# Patient Record
Sex: FEMALE | Race: WHITE | NOT HISPANIC OR LATINO | Employment: OTHER | URBAN - METROPOLITAN AREA
[De-identification: names, ages, dates, MRNs, and addresses within clinical notes are randomized per-mention and may not be internally consistent; named-entity substitution may affect disease eponyms.]

---

## 2018-11-29 ENCOUNTER — TELEPHONE (OUTPATIENT)
Dept: OTHER | Facility: OTHER | Age: 83
End: 2018-11-29

## 2018-11-29 ENCOUNTER — HOSPITAL ENCOUNTER (INPATIENT)
Facility: HOSPITAL | Age: 83
LOS: 1 days | Discharge: NURSING, REHAB OR LONG-TERM CARE | DRG: 378 | End: 2018-11-30
Attending: EMERGENCY MEDICINE | Admitting: HOSPITALIST
Payer: MEDICARE

## 2018-11-29 DIAGNOSIS — K92.2 GASTROINTESTINAL HEMORRHAGE, UNSPECIFIED GASTROINTESTINAL HEMORRHAGE TYPE: ICD-10-CM

## 2018-11-29 DIAGNOSIS — S31.000A SACRAL WOUND: ICD-10-CM

## 2018-11-29 DIAGNOSIS — K92.2 ACUTE UPPER GI BLEED: Primary | ICD-10-CM

## 2018-11-29 LAB
APTT PPP: 33 SECONDS (ref 26–38)
BASOPHILS # BLD AUTO: 0.04 THOUSANDS/ΜL (ref 0–0.1)
BASOPHILS NFR BLD AUTO: 1 % (ref 0–1)
EOSINOPHIL # BLD AUTO: 0.25 THOUSAND/ΜL (ref 0–0.61)
EOSINOPHIL NFR BLD AUTO: 4 % (ref 0–6)
ERYTHROCYTE [DISTWIDTH] IN BLOOD BY AUTOMATED COUNT: 16.4 % (ref 11.6–15.1)
HCT VFR BLD AUTO: 28.6 % (ref 34.8–46.1)
HGB BLD-MCNC: 9 G/DL (ref 11.5–15.4)
IMM GRANULOCYTES # BLD AUTO: 0.02 THOUSAND/UL (ref 0–0.2)
IMM GRANULOCYTES NFR BLD AUTO: 0 % (ref 0–2)
INR PPP: 1.05 (ref 0.86–1.17)
LYMPHOCYTES # BLD AUTO: 1.59 THOUSANDS/ΜL (ref 0.6–4.47)
LYMPHOCYTES NFR BLD AUTO: 22 % (ref 14–44)
MCH RBC QN AUTO: 31 PG (ref 26.8–34.3)
MCHC RBC AUTO-ENTMCNC: 31.5 G/DL (ref 31.4–37.4)
MCV RBC AUTO: 99 FL (ref 82–98)
MONOCYTES # BLD AUTO: 0.77 THOUSAND/ΜL (ref 0.17–1.22)
MONOCYTES NFR BLD AUTO: 11 % (ref 4–12)
NEUTROPHILS # BLD AUTO: 4.46 THOUSANDS/ΜL (ref 1.85–7.62)
NEUTS SEG NFR BLD AUTO: 62 % (ref 43–75)
NRBC BLD AUTO-RTO: 0 /100 WBCS
PLATELET # BLD AUTO: 279 THOUSANDS/UL (ref 149–390)
PMV BLD AUTO: 9.1 FL (ref 8.9–12.7)
PROTHROMBIN TIME: 13.1 SECONDS (ref 11.8–14.2)
RBC # BLD AUTO: 2.9 MILLION/UL (ref 3.81–5.12)
WBC # BLD AUTO: 7.13 THOUSAND/UL (ref 4.31–10.16)

## 2018-11-29 PROCEDURE — 85730 THROMBOPLASTIN TIME PARTIAL: CPT | Performed by: EMERGENCY MEDICINE

## 2018-11-29 PROCEDURE — 99285 EMERGENCY DEPT VISIT HI MDM: CPT

## 2018-11-29 PROCEDURE — 85025 COMPLETE CBC W/AUTO DIFF WBC: CPT

## 2018-11-29 PROCEDURE — 86900 BLOOD TYPING SEROLOGIC ABO: CPT | Performed by: EMERGENCY MEDICINE

## 2018-11-29 PROCEDURE — 80053 COMPREHEN METABOLIC PANEL: CPT | Performed by: EMERGENCY MEDICINE

## 2018-11-29 PROCEDURE — 93005 ELECTROCARDIOGRAM TRACING: CPT

## 2018-11-29 PROCEDURE — 82272 OCCULT BLD FECES 1-3 TESTS: CPT

## 2018-11-29 PROCEDURE — 86850 RBC ANTIBODY SCREEN: CPT | Performed by: EMERGENCY MEDICINE

## 2018-11-29 PROCEDURE — 36415 COLL VENOUS BLD VENIPUNCTURE: CPT | Performed by: EMERGENCY MEDICINE

## 2018-11-29 PROCEDURE — 84484 ASSAY OF TROPONIN QUANT: CPT | Performed by: EMERGENCY MEDICINE

## 2018-11-29 PROCEDURE — 85610 PROTHROMBIN TIME: CPT | Performed by: EMERGENCY MEDICINE

## 2018-11-29 PROCEDURE — 86901 BLOOD TYPING SEROLOGIC RH(D): CPT | Performed by: EMERGENCY MEDICINE

## 2018-11-30 VITALS
RESPIRATION RATE: 22 BRPM | OXYGEN SATURATION: 97 % | DIASTOLIC BLOOD PRESSURE: 70 MMHG | HEART RATE: 77 BPM | BODY MASS INDEX: 21.66 KG/M2 | WEIGHT: 114.64 LBS | SYSTOLIC BLOOD PRESSURE: 158 MMHG | TEMPERATURE: 98.6 F

## 2018-11-30 PROBLEM — K92.2 GI BLEED: Status: ACTIVE | Noted: 2018-11-30

## 2018-11-30 LAB
ALBUMIN SERPL BCP-MCNC: 3.2 G/DL (ref 3.5–5)
ALP SERPL-CCNC: 64 U/L (ref 46–116)
ALT SERPL W P-5'-P-CCNC: 16 U/L (ref 12–78)
ANION GAP SERPL CALCULATED.3IONS-SCNC: 7 MMOL/L (ref 4–13)
ANION GAP SERPL CALCULATED.3IONS-SCNC: 7 MMOL/L (ref 4–13)
AST SERPL W P-5'-P-CCNC: 17 U/L (ref 5–45)
ATRIAL RATE: 78 BPM
BILIRUB SERPL-MCNC: 0.3 MG/DL (ref 0.2–1)
BUN SERPL-MCNC: 58 MG/DL (ref 5–25)
BUN SERPL-MCNC: 63 MG/DL (ref 5–25)
CALCIUM SERPL-MCNC: 8.6 MG/DL (ref 8.3–10.1)
CALCIUM SERPL-MCNC: 8.6 MG/DL (ref 8.3–10.1)
CHLORIDE SERPL-SCNC: 100 MMOL/L (ref 100–108)
CHLORIDE SERPL-SCNC: 99 MMOL/L (ref 100–108)
CO2 SERPL-SCNC: 29 MMOL/L (ref 21–32)
CO2 SERPL-SCNC: 29 MMOL/L (ref 21–32)
CREAT SERPL-MCNC: 2.5 MG/DL (ref 0.6–1.3)
CREAT SERPL-MCNC: 2.7 MG/DL (ref 0.6–1.3)
GFR SERPL CREATININE-BSD FRML MDRD: 15 ML/MIN/1.73SQ M
GFR SERPL CREATININE-BSD FRML MDRD: 16 ML/MIN/1.73SQ M
GLUCOSE SERPL-MCNC: 95 MG/DL (ref 65–140)
GLUCOSE SERPL-MCNC: 96 MG/DL (ref 65–140)
HGB BLD-MCNC: 9.4 G/DL (ref 11.5–15.4)
P AXIS: 79 DEGREES
POTASSIUM SERPL-SCNC: 4 MMOL/L (ref 3.5–5.3)
POTASSIUM SERPL-SCNC: 4.4 MMOL/L (ref 3.5–5.3)
PR INTERVAL: 288 MS
PROT SERPL-MCNC: 7.8 G/DL (ref 6.4–8.2)
QRS AXIS: -48 DEGREES
QRSD INTERVAL: 80 MS
QT INTERVAL: 360 MS
QTC INTERVAL: 410 MS
SODIUM SERPL-SCNC: 135 MMOL/L (ref 136–145)
SODIUM SERPL-SCNC: 136 MMOL/L (ref 136–145)
T WAVE AXIS: -9 DEGREES
TROPONIN I SERPL-MCNC: 0.02 NG/ML
VENTRICULAR RATE: 78 BPM

## 2018-11-30 PROCEDURE — 99232 SBSQ HOSP IP/OBS MODERATE 35: CPT | Performed by: NURSE PRACTITIONER

## 2018-11-30 PROCEDURE — 93010 ELECTROCARDIOGRAM REPORT: CPT | Performed by: INTERNAL MEDICINE

## 2018-11-30 PROCEDURE — 99223 1ST HOSP IP/OBS HIGH 75: CPT | Performed by: HOSPITALIST

## 2018-11-30 PROCEDURE — C9113 INJ PANTOPRAZOLE SODIUM, VIA: HCPCS | Performed by: PHYSICIAN ASSISTANT

## 2018-11-30 PROCEDURE — 80048 BASIC METABOLIC PNL TOTAL CA: CPT | Performed by: PHYSICIAN ASSISTANT

## 2018-11-30 PROCEDURE — 99239 HOSP IP/OBS DSCHRG MGMT >30: CPT | Performed by: HOSPITALIST

## 2018-11-30 PROCEDURE — 85018 HEMOGLOBIN: CPT | Performed by: PHYSICIAN ASSISTANT

## 2018-11-30 RX ORDER — ONDANSETRON 2 MG/ML
4 INJECTION INTRAMUSCULAR; INTRAVENOUS EVERY 6 HOURS PRN
Status: DISCONTINUED | OUTPATIENT
Start: 2018-11-30 | End: 2018-11-30 | Stop reason: HOSPADM

## 2018-11-30 RX ORDER — ACETAMINOPHEN 325 MG/1
1-2 TABLET ORAL EVERY 4 HOURS PRN
COMMUNITY

## 2018-11-30 RX ORDER — AMLODIPINE BESYLATE 5 MG/1
5 TABLET ORAL DAILY
Status: DISCONTINUED | OUTPATIENT
Start: 2018-11-30 | End: 2018-11-30 | Stop reason: HOSPADM

## 2018-11-30 RX ORDER — PANTOPRAZOLE SODIUM 40 MG/1
40 TABLET, DELAYED RELEASE ORAL
Refills: 0
Start: 2018-12-01

## 2018-11-30 RX ORDER — ACETAMINOPHEN 325 MG/1
650 TABLET ORAL EVERY 6 HOURS PRN
Status: DISCONTINUED | OUTPATIENT
Start: 2018-11-30 | End: 2018-11-30 | Stop reason: HOSPADM

## 2018-11-30 RX ORDER — SODIUM PHOSPHATE, DIBASIC AND SODIUM PHOSPHATE, MONOBASIC 7; 19 G/133ML; G/133ML
ENEMA RECTAL
COMMUNITY

## 2018-11-30 RX ORDER — DIGOXIN 125 MCG
TABLET ORAL
COMMUNITY

## 2018-11-30 RX ORDER — ISOSORBIDE MONONITRATE 30 MG/1
30 TABLET, EXTENDED RELEASE ORAL DAILY
Status: DISCONTINUED | OUTPATIENT
Start: 2018-11-30 | End: 2018-11-30 | Stop reason: HOSPADM

## 2018-11-30 RX ORDER — BISACODYL 10 MG
1 SUPPOSITORY, RECTAL RECTAL DAILY
COMMUNITY

## 2018-11-30 RX ORDER — LEVOTHYROXINE SODIUM 0.07 MG/1
0.5 TABLET ORAL DAILY
COMMUNITY

## 2018-11-30 RX ORDER — SODIUM CHLORIDE 9 MG/ML
75 INJECTION, SOLUTION INTRAVENOUS CONTINUOUS
Status: DISCONTINUED | OUTPATIENT
Start: 2018-11-30 | End: 2018-11-30 | Stop reason: HOSPADM

## 2018-11-30 RX ORDER — METOPROLOL TARTRATE 50 MG/1
1 TABLET, FILM COATED ORAL 2 TIMES DAILY
COMMUNITY

## 2018-11-30 RX ORDER — MULTIVIT-MIN/IRON/FOLIC ACID/K 18-600-40
CAPSULE ORAL
COMMUNITY

## 2018-11-30 RX ORDER — AMLODIPINE BESYLATE 5 MG/1
1 TABLET ORAL DAILY
COMMUNITY

## 2018-11-30 RX ORDER — PANTOPRAZOLE SODIUM 40 MG/1
40 TABLET, DELAYED RELEASE ORAL
Status: DISCONTINUED | OUTPATIENT
Start: 2018-12-01 | End: 2018-11-30 | Stop reason: HOSPADM

## 2018-11-30 RX ORDER — ISOSORBIDE MONONITRATE 30 MG/1
1 TABLET, EXTENDED RELEASE ORAL DAILY
COMMUNITY

## 2018-11-30 RX ORDER — DIGOXIN 125 MCG
125 TABLET ORAL DAILY
Status: DISCONTINUED | OUTPATIENT
Start: 2018-11-30 | End: 2018-11-30 | Stop reason: HOSPADM

## 2018-11-30 RX ORDER — LEVOTHYROXINE SODIUM 0.07 MG/1
37.5 TABLET ORAL
Status: DISCONTINUED | OUTPATIENT
Start: 2018-11-30 | End: 2018-11-30 | Stop reason: HOSPADM

## 2018-11-30 RX ORDER — FUROSEMIDE 40 MG/1
1 TABLET ORAL DAILY
COMMUNITY

## 2018-11-30 RX ORDER — METOPROLOL TARTRATE 50 MG/1
50 TABLET, FILM COATED ORAL 2 TIMES DAILY
Status: DISCONTINUED | OUTPATIENT
Start: 2018-11-30 | End: 2018-11-30 | Stop reason: HOSPADM

## 2018-11-30 RX ORDER — FUROSEMIDE 40 MG/1
40 TABLET ORAL DAILY
Status: DISCONTINUED | OUTPATIENT
Start: 2018-11-30 | End: 2018-11-30 | Stop reason: HOSPADM

## 2018-11-30 RX ADMIN — Medication 80 MG: at 03:58

## 2018-11-30 RX ADMIN — SODIUM CHLORIDE 75 ML/HR: 0.9 INJECTION, SOLUTION INTRAVENOUS at 04:01

## 2018-11-30 RX ADMIN — FUROSEMIDE 40 MG: 40 TABLET ORAL at 08:01

## 2018-11-30 RX ADMIN — Medication 8 MG/HR: at 04:33

## 2018-11-30 RX ADMIN — ISOSORBIDE MONONITRATE 30 MG: 30 TABLET, EXTENDED RELEASE ORAL at 08:01

## 2018-11-30 RX ADMIN — METOPROLOL TARTRATE 50 MG: 50 TABLET, FILM COATED ORAL at 08:01

## 2018-11-30 RX ADMIN — LEVOTHYROXINE SODIUM 37.5 MCG: 75 TABLET ORAL at 08:00

## 2018-11-30 RX ADMIN — AMLODIPINE BESYLATE 5 MG: 5 TABLET ORAL at 08:01

## 2018-11-30 RX ADMIN — DIGOXIN 125 MCG: 125 TABLET ORAL at 08:01

## 2018-11-30 NOTE — ED PROVIDER NOTES
History  Chief Complaint   Patient presents with    Rectal Bleeding     pt presents to ER from [address] [place] [address] after noticing her having multiple episodes of rectal bleeding, patient had blood work done and has a known low H/H but patient was refusing blood       This 80-year-old female with history of aortic aneurysm CAD atrial fibrillation, chronic kidney disease, benign sigmoid tumor, year old bowel syndrome, chronic CHF and dementia has rectal bleeding tonight  She denies any new pain  She denies nausea vomiting  Patient denies chest pain palpitations and dyspnea on exertion but she is a poor historian  She is not sure how she would  Feel if she ambulated  Prior to Admission Medications   Prescriptions Last Dose Informant Patient Reported? Taking?    Cholecalciferol (VITAMIN D) 2000 units CAPS   Yes No   Sig: Take by mouth   acetaminophen (TYLENOL) 325 mg tablet   Yes No   Sig: Take 1-2 tablets by mouth every 4 (four) hours as needed   amLODIPine (NORVASC) 5 mg tablet   Yes No   Sig: Take 1 tablet by mouth daily   bisacodyl (DULCOLAX) 10 mg suppository   Yes No   Sig: Insert 1 suppository into the rectum daily   digoxin (LANOXIN) 0 125 mg tablet   Yes No   Sig: Take by mouth   furosemide (LASIX) 40 mg tablet   Yes No   Sig: Take 1 tablet by mouth daily   isosorbide mononitrate (IMDUR) 30 mg 24 hr tablet   Yes No   Sig: Take 1 tablet by mouth daily   levothyroxine (SYNTHROID) 75 mcg tablet   Yes No   Sig: Take 0 5 tablets by mouth daily   magnesium hydroxide (MILK OF MAGNESIA) 400 mg/5 mL oral suspension   Yes No   Sig: Take by mouth   metoprolol tartrate (LOPRESSOR) 50 mg tablet   Yes No   Sig: Take 1 tablet by mouth 2 (two) times a day   sodium phosphate-biphosphate (FLEET) 7-19 g 118 mL enema   Yes No   Sig: Insert into the rectum      Facility-Administered Medications: None       Past Medical History:   Diagnosis Date    Abdominal aortic aneurysm without rupture (HCC)     Abnormal weight loss  Acute respiratory failure with hypoxia and hypercapnia (HCC)     Age related osteoporosis     Age-related nuclear cataract, bilateral     Anxiety     Atherosclerotic heart disease of native coronary artery without angina pectoris     Atrial fibrillation (HCC)     Benign neoplasm of sigmoid colon     Blindness of right eye     Calculus of gallbladder with chronic cholecystitis     Calculus of gallbladder without cholecystitis with obstruction     Chronic kidney disease, stage III (moderate) ([facility])     Chronic pain     Chronic pain due to trauma     Dementia     Difficulty walking     Disease of thyroid gland     Generalized atherosclerosis     GERD (gastroesophageal reflux disease)     Head injury     Heart failure (HCC)     Hemorrhoids     Hx of falling     Hypertension     Hypertensive retinopathy     IBS (irritable bowel syndrome)     Iron deficiency     Malignant neoplasm of head, face and neck (HCC)     [facility] infarction) [facility]     Mixed incontinence     Nonrheumatic mitral (valve) insufficiency     Occlusion and stenosis of left carotid artery     Polyosteoarthritis     Presence of intraocular lens     PVD (peripheral vascular disease) ([facility])     Rectal polyp     Recurrent pneumonia     Renal disorder     CKD stage 3    Thoracic aortic aneurysm without rupture ([facility] )     TIA (transient ischemic attack)     Tinea unguium     Unspecified abdominal pain     Unspecified fracture of unspecified thoracic vertebra, initial encounter for closed fracture ([facility] )        History reviewed  No pertinent surgical history  History reviewed  No pertinent family history  I have reviewed and agree with the history as documented      Social History   Substance Use Topics    Smoking status: Never Smoker    Smokeless tobacco: Never Used    Alcohol use No        Review of Systems   Unable to perform ROS: Dementia       Physical Exam  Physical Exam   Constitutional: She is oriented to person, place, and time  She appears well-developed and well-nourished  No distress  HENT:   Head: Normocephalic and atraumatic  Mouth/Throat: Oropharynx is clear and moist    Eyes: Pupils are equal, round, and reactive to light  Conjunctivae and EOM are normal    Neck: Normal range of motion  Neck supple  No JVD present  Cardiovascular: Normal rate and intact distal pulses  No murmur heard  Irregular irregular rhythm   Pulmonary/Chest: Effort normal and breath sounds normal  No stridor  Abdominal: Soft  Bowel sounds are normal  She exhibits mass ( palpable upper abdominal pulsating mass)  She exhibits no distension  There is tenderness (  Tender in left upper quadrant)  There is no rebound and no guarding  No hernia  Genitourinary: Rectal exam shows guaiac positive stool  Genitourinary Comments: External hemorrhoids noted  Unable to pass finger to the rectal wall  Large amount of black stool which is heme-positive   Musculoskeletal: Normal range of motion  She exhibits no edema  Neurological: She is alert and oriented to person, place, and time  She has normal reflexes  No cranial nerve deficit  Coordination normal    Skin: Skin is warm and dry  Capillary refill takes less than 2 seconds  No rash noted  She is not diaphoretic  There is pallor  Psychiatric: She has a normal mood and affect  Nursing note and vitals reviewed        Vital Signs  ED Triage Vitals [11/29/18 2323]   Temperature Pulse Respirations Blood Pressure SpO2   98 9 °F (37 2 °C) 78 18 (!) 189/81 97 %      Temp Source Heart Rate Source Patient Position - Orthostatic VS BP Location FiO2 (%)   Temporal Monitor Lying Left arm --      Pain Score       No Pain           Vitals:    11/30/18 0030 11/30/18 0045 11/30/18 0100 11/30/18 0115   BP: 153/67 151/68 153/68 148/67   Pulse: 78 79 80 81   Patient Position - Orthostatic VS:           Visual Acuity  Visual Acuity      Most Recent Value   L Pupil Size (mm)  3   R Pupil Size (mm)  3          ED Medications  Medications - No data to display    Diagnostic Studies  Results Reviewed     Procedure Component Value Units Date/Time    Hemoglobin and hematocrit, blood [[id number]]     Lab Status:  No result Specimen:  Blood     Troponin I [[id number]]  (Normal) Collected:  11/29/18 2332    Lab Status:  Final result Specimen:  Blood from Arm, Right Updated:  11/30/18 0003     Troponin I 0 02 ng/mL     Comprehensive metabolic panel [[id number]]  (Abnormal) Collected:  11/29/18 2332    Lab Status:  Final result Specimen:  Blood from Arm, Right Updated:  11/30/18 0002     Sodium 135 (L) mmol/L      Potassium 4 4 mmol/L      Chloride 99 (L) mmol/L      CO2 29 mmol/L      ANION GAP 7 mmol/L      BUN 63 (H) mg/dL      Creatinine 2 70 (H) mg/dL      Glucose 95 mg/dL      Calcium 8 6 mg/dL      AST 17 U/L      ALT 16 U/L      Alkaline Phosphatase 64 U/L      Total Protein 7 8 g/dL      Albumin 3 2 (L) g/dL      Total Bilirubin 0 30 mg/dL      eGFR 15 ml/min/1 73sq m     Narrative:         National Kidney Disease Education Program recommendations are as follows:  GFR calculation is accurate only with a steady state creatinine  Chronic Kidney disease less than 60 ml/min/1 73 sq  meters  Kidney failure less than 15 ml/min/1 73 sq  meters      Protime-INR [[id number]]  (Normal) Collected:  11/29/18 2335    Lab Status:  Final result Specimen:  Blood from Arm, Right Updated:  11/29/18 2357     Protime 13 1 seconds      INR 1 05    APTT [[id number]]  (Normal) Collected:  11/29/18 2335    Lab Status:  Final result Specimen:  Blood from Arm, Right Updated:  11/29/18 2357     PTT 33 seconds     CBC and differential [[id number]]  (Abnormal) Collected:  11/29/18 2332    Lab Status:  Final result Specimen:  Blood from Arm, Right Updated:  11/29/18 2345     WBC 7 13 Thousand/uL      RBC 2 90 (L) Million/uL      Hemoglobin 9 0 (L) g/dL      Hematocrit 28 6 (L) %      MCV 99 (H) fL      MCH 31 0 pg      MCHC 31 5 g/dL      RDW 16  4 (H) %      MPV 9 1 fL      Platelets 572 Thousands/uL      nRBC 0 /100 WBCs      Neutrophils Relative 62 %      Immat GRANS % 0 %      Lymphocytes Relative 22 %      Monocytes Relative 11 %      Eosinophils Relative 4 %      Basophils Relative 1 %      Neutrophils Absolute 4 46 Thousands/µL      Immature Grans Absolute 0 02 Thousand/uL      Lymphocytes Absolute 1 59 Thousands/µL      Monocytes Absolute 0 77 Thousand/µL      Eosinophils Absolute 0 25 Thousand/µL      Basophils Absolute 0 04 Thousands/µL                  No orders to display              Procedures  ECG 12 Lead Documentation  Date/Time: 11/30/2018 12:03 AM  Performed by: Edgar Cárdenas  Authorized by: Kennth Grand     ECG reviewed by me, the ED Provider: yes    Patient location:  ED  Previous ECG:     Previous ECG:  Compared to current    Comparison ECG info:   compared to September 16, 2015,    Similarity:  No change  Rhythm:     Rhythm: sinus rhythm and A-V block    Ectopy:     Ectopy: none    QRS:     QRS axis:  Left    QRS intervals:  Normal  Conduction:     Conduction: normal    ST segments:     ST segments:  Normal  T waves:     T waves: inverted      Inverted:  III, aVF, V3 and V4  Q waves:     Q waves:  III and aVF           Phone Contacts  ED Phone Contact    ED Course                               MDM  Number of Diagnoses or Management Options  Acute upper GI bleed: new and requires workup  Diagnosis management comments:  Case discussed with patient's power of  who is her sister Triny Vail  Both patient and the power of  believes she should be admitted for workup  Patient has had a small amount of red rectal bleeding here         Amount and/or Complexity of Data Reviewed  Clinical lab tests: ordered and reviewed  Review and summarize past medical records: yes  Discuss the patient with other providers: yes  Independent visualization of images, tracings, or specimens: yes      CritCare Time    Disposition  Final diagnoses:   Acute upper GI bleed     Time reflects when diagnosis was documented in both MDM as applicable and the Disposition within this note     Time User Action Codes Description Comment    11/30/2018  1:34 AM Shonaabigail Chacon Miriam [K92 2] Acute upper GI bleed       ED Disposition     ED Disposition Condition Comment    Admit  Case was discussed with  Night practitioner and the patient's admission status was agreed to be Admission Status: inpatient status to the service of Dr Christian Lynch   Follow-up Information    None         Patient's Medications   Discharge Prescriptions    No medications on file     No discharge procedures on file      ED Provider  Electronically Signed by           Ebony Luong DO  11/30/18 9810

## 2018-11-30 NOTE — DISCHARGE SUMMARY
Discharge- Tye Valdes 2/6/1925, 80 y o  female MRN: [id number]    Unit/Bed#: -02 Encounter: [id number]    Primary Care Provider: Puja Meredith MD   Date and time admitted to hospital: 11/29/2018 11:21 PM        Chronic kidney disease, stage IV (severe) (HCC)   Assessment & Plan    Cr 2 7, likely patients new baseline, continue to monitor     * GI bleed   Assessment & Plan    -patient received a Protonix drip on the hospital   -her hemoglobin was trended and is stable at 9 4     -She presented with bright red blood per rectum but has had smears of melena   -patient did have acute blood loss anemia due to GI bleeding, POA  -I did discuss the case with GI  In 2015 the patient had a rectal polyp/mass that was unable to be excised on colonoscopy  Surgical consultation was recommended but the patient refused  I did have a lengthy discussion with the patient's niece and sister  They do not want any further workup, studies, or procedures done  They will likely make the patient do not hospitalize after discharge and may pursue hospice  Discharging Physician / Practitioner: Tasha Grant MD  PCP: Puja Meredith MD  Admission Date:   Admission Orders     Ordered        11/30/18 0136  Inpatient Admission (expected length of stay for this patient is greater than two midnights)  Once             Discharge Date: 11/30/18    Resolved Problems  Date Reviewed: 11/30/2018    None          Consultations During Hospital Stay:  · GI    Procedures Performed:     · None, family does not want any procedures    Significant Findings / Test Results:     · Anemia    Incidental Findings:   · None    Test Results Pending at Discharge (will require follow up):    · None     Outpatient Tests Requested:  · None, patient's family does not want any further testing    Complications:  None    Reason for Admission:  Bright red blood per rectum and melena    Hospital Course:     Tye Valdes is a 80 y o  female patient who originally presented to the hospital on 11/29/2018 due to Bright red blood per rectum and melena as outlined in the H&P done earlier today  Please see above list of diagnoses and related plan for additional information  Condition at Discharge: fair     Discharge Day Visit / Exam:     Subjective:  Patient without acute complaints at this time  As per nursing patient with smears of melena  Vitals: Blood Pressure: 163/77 (11/30/18 0721)  Pulse: 95 (11/30/18 0721)  Temperature: 98 6 °F (37 °C) (11/30/18 0721)  Temp Source: Oral (11/30/18 0300)  Respirations: 16 (11/30/18 0721)  Weight - Scale: 52 kg (114 lb 10 2 oz) (11/30/18 0721)  SpO2: 97 % (11/30/18 0721)  Exam:   Physical Exam  Gen: NAD, awake and alert, well developed, well nourished  Eyes: EOMI, PERRLA, no scleral icterus  ENMT:  Oropharynx clear of erythema or exudates, no nasal discharge, no otic discharge, moist mucous membranes  Neck:  Supple  Lymph:  No anterior or posterior cervical or supraclavicular lymphadenopathy  Cardiovascular:  Regular rate and rhythm, normal S1-S2, no murmurs, rubs, or gallops  Lungs:  Clear to auscultation bilaterally anteriorly, no wheezes, or rales, or rhonchi  Abdomen:  Positive bowel sounds, soft, nontender, nondistended, no palpable organomegaly   Skin:  Intact, no obvious lesions or rashes, no edema  Neuro: Cranial nerves 2-12 are intact, non-focal    Discussion with Family:  Patient's niece and daughter at bedside    Discharge instructions/Information to patient and family:   See after visit summary for information provided to patient and family  Provisions for Follow-Up Care:  See after visit summary for information related to follow-up care and any pertinent home health orders        Disposition:     [facility] [address] [facility] to [facility]:   · Not Applicable to this Patient - Not Applicable to this Patient    Planned Readmission: None but if patient is not may do not hospitalize or hospice she surely will be back within 30 days     Discharge Statement:  I spent 35 minutes discharging the patient  This time was spent on the day of discharge  I had direct contact with the patient on the day of discharge  Greater than 50% of the total time was spent examining patient, answering all patient questions, arranging and discussing plan of care with patient as well as directly providing post-discharge instructions  Additional time then spent on discharge activities  Discharge Medications:  See after visit summary for reconciled discharge medications provided to patient and family        ** Please Note: This note has been constructed using a voice recognition system **

## 2018-11-30 NOTE — H&P
[address] [facility]    H&P- Tran Ibanez 2/6/1925, 80 y o  female MRN: [id number]    Unit/Bed#: -02 Encounter: [id number]    Primary Care Provider: Salvador Leahy MD   Date and time admitted to hospital: 11/29/2018 11:21 PM        * GI bleed   Assessment & Plan    H&H Q 6H  GI consult  NPO  D5 with NSS 75 mL/hour  PPI gtt  Patient states she does not want blood transfusion  Type and screen  Cardiopulmonary monitoring     Chronic kidney disease, stage III (moderate) (HCC)   Assessment & Plan    Cr 2 7, likely patients new baseline, continue to monitor       VTE Prophylaxis: None due to GI bleed  / sequential compression device   Code Status: Level 3 DNI/DNR  POLST: POLST form is on file already (pre-hospital)  Discussion with family: None    Anticipated Length of Stay:  Patient will be admitted on an Inpatient basis with an anticipated length of stay of  Greater than 2 midnights  Justification for Hospital Stay: Rectal bleed    Chief Complaint:   Rectal Bleeding    History of Present Illness:    Tran Ibanez is a 80 y o  female with a PMH for CAD, AFib, CKD, CHF, dementia presents with rectal bleeding that began tonight  Patient denies pain and states that she has not noticed any dark or bloody bowel movements  Patient came via [address] [facility] after an aide noticed the patient had multiple episodes of rectal bleeding  Patient has a known history of low hemoglobin and hematocrit has been worked up in the past   Patient states adamantly that she does not want a blood transfusion and may not wish to undergo EGD, colonoscopy  She is amicable to IVF  Patient has no other complaints and denies shortness of breath, anxiety, chest pain, palpitations, nausea, vomiting, diarrhea  Review of Systems:    Review of Systems   Constitutional: Negative for chills, diaphoresis, fatigue and fever  Eyes: Negative for photophobia, redness and visual disturbance     Respiratory: Negative for cough, shortness of breath and wheezing  Cardiovascular: Negative for chest pain, palpitations and leg swelling  Gastrointestinal: Positive for anal bleeding  Negative for constipation, diarrhea, nausea and vomiting  Endocrine: Negative for polydipsia, polyphagia and polyuria  Genitourinary: Negative for dysuria, flank pain, frequency, hematuria and urgency  Musculoskeletal: Negative for back pain, neck pain and neck stiffness  Skin: Negative for pallor, rash and wound  Neurological: Negative for dizziness, tremors, syncope, weakness, light-headedness and headaches  All other systems reviewed and are negative        Past Medical and Surgical History:     Past Medical History:   Diagnosis Date    Abdominal aortic aneurysm without rupture (HCC)     Abnormal weight loss     Acute respiratory failure with hypoxia and hypercapnia (HCC)     Age related osteoporosis     Age-related nuclear cataract, bilateral     Anxiety     Atherosclerotic heart disease of native coronary artery without angina pectoris     Atrial fibrillation (HCC)     Benign neoplasm of sigmoid colon     Blindness of right eye     Calculus of gallbladder with chronic cholecystitis     Calculus of gallbladder without cholecystitis with obstruction     [facility], [facility]) [facility]     Chronic pain     Chronic pain due to trauma     Dementia     Difficulty walking     Disease of thyroid gland     Generalized atherosclerosis     GERD (gastroesophageal reflux disease)     Head injury     Heart failure (HCC)     Hemorrhoids     Hx of falling     Hypertension     Hypertensive retinopathy     IBS (irritable bowel syndrome)     Iron deficiency     Malignant neoplasm of head, face and neck (HCC)     [facility]) [facility]     Mixed incontinence     Nonrheumatic mitral (valve) insufficiency     Occlusion and stenosis of left carotid artery     Polyosteoarthritis     Presence of intraocular lens     PVD (peripheral vascular disease) [facility]     Rectal polyp     Recurrent pneumonia     Renal disorder     CKD stage 3    Thoracic aortic aneurysm without rupture ([facility] )     TIA (transient ischemic attack)     Tinea unguium     Unspecified abdominal pain     Unspecified fracture of unspecified thoracic vertebra, initial encounter for closed fracture [facility])        History reviewed  No pertinent surgical history  Meds/Allergies:    Prior to Admission medications    Medication Sig Start Date End Date Taking? Authorizing Provider   acetaminophen (TYLENOL) 325 mg tablet Take 1-2 tablets by mouth every 4 (four) hours as needed    Historical Provider, MD   amLODIPine (NORVASC) 5 mg tablet Take 1 tablet by mouth daily    Historical Provider, MD   bisacodyl (DULCOLAX) 10 mg suppository Insert 1 suppository into the rectum daily    Historical Provider, MD   Cholecalciferol (VITAMIN D) 2000 units CAPS Take by mouth    Historical Provider, MD   digoxin (LANOXIN) 0 125 mg tablet Take by mouth    Historical Provider, MD   furosemide (LASIX) 40 mg tablet Take 1 tablet by mouth daily    Historical Provider, MD   isosorbide mononitrate (IMDUR) 30 mg 24 hr tablet Take 1 tablet by mouth daily    Historical Provider, MD   levothyroxine (SYNTHROID) 75 mcg tablet Take 0 5 tablets by mouth daily    Historical Provider, MD   magnesium hydroxide (MILK OF MAGNESIA) 400 mg/5 mL oral suspension Take by mouth    Historical Provider, MD   metoprolol tartrate (LOPRESSOR) 50 mg tablet Take 1 tablet by mouth 2 (two) times a day    Historical Provider, MD   sodium phosphate-biphosphate (FLEET) 7-19 g 118 mL enema Insert into the rectum    Historical Provider, MD     I have reviewed home medications using allscripts  Allergies:    Allergies   Allergen Reactions    Ethylenediamine Tetra-Acetate [Edetic Acid]     Fish-Derived Products     Hctz [Hydrochlorothiazide]     Iodides     Iodine     Lidocaine     Morphine     Neomycin     Paba Derivatives     Penicillins     Thiazide-Type Diuretics        Social History:     Marital Status: Single   Occupation: Retired  Patient Pre-hospital Living Situation: [address] [place] [address]  Patient Pre-hospital Level of Mobility: Noncontributory  Patient Pre-hospital Diet Restrictions: None  Substance Use History:   History   Alcohol Use No     History   Smoking Status    Never Smoker   Smokeless Tobacco    Never Used     History   Drug Use No       Family History:    History reviewed  No pertinent family history  Physical Exam:     Vitals:   Blood Pressure: 155/70 (11/30/18 0215)  Pulse: 79 (11/30/18 0215)  Temperature: 98 9 °F (37 2 °C) (11/29/18 2323)  Temp Source: Temporal (11/29/18 2323)  Respirations: 18 (11/30/18 0215)  SpO2: 95 % (11/30/18 0215)    Physical Exam   Constitutional: She is oriented to person, place, and time  Vital signs are normal  She appears well-developed and well-nourished  Non-toxic appearance  No distress  HENT:   Head: Normocephalic and atraumatic  Mouth/Throat: Oropharynx is clear and moist and mucous membranes are normal  Normal dentition  No oropharyngeal exudate  Eyes: Pupils are equal, round, and reactive to light  Conjunctivae are normal  Right eye exhibits no discharge  Left eye exhibits no discharge  No scleral icterus  Neck: No JVD present  No tracheal deviation and no erythema present  Cardiovascular: Normal rate, regular rhythm, normal heart sounds, intact distal pulses and normal pulses  Exam reveals no gallop and no friction rub  No murmur heard  Pulmonary/Chest: Effort normal and breath sounds normal  No accessory muscle usage or stridor  No respiratory distress  She has no decreased breath sounds  She has no wheezes  She has no rales  Abdominal: Soft  Bowel sounds are normal  She exhibits no distension and no mass  There is tenderness  There is no rebound     Genitourinary:   Genitourinary Comments: Dark stools  Possible rectal prolapse, external hemorrhoids   Musculoskeletal: She exhibits no edema, tenderness or deformity  Neurological: She is alert and oriented to person, place, and time  GCS eye subscore is 4  GCS verbal subscore is 5  GCS motor subscore is 6  Skin: Skin is warm and dry  No rash noted  She is not diaphoretic  No erythema  No pallor  Psychiatric: She has a normal mood and affect  Her behavior is normal    Nursing note and vitals reviewed  Additional Data:     Lab Results: I have personally reviewed pertinent reports  Results from last 7 days  Lab Units 11/29/18  2332   WBC Thousand/uL 7 13   HEMOGLOBIN g/dL 9 0*   HEMATOCRIT % 28 6*   PLATELETS Thousands/uL 279   NEUTROS PCT % 62   LYMPHS PCT % 22   MONOS PCT % 11   EOS PCT % 4       Results from last 7 days  Lab Units 11/29/18  2332   SODIUM mmol/L 135*   POTASSIUM mmol/L 4 4   CHLORIDE mmol/L 99*   CO2 mmol/L 29   BUN mg/dL 63*   CREATININE mg/dL 2 70*   ANION GAP mmol/L 7   CALCIUM mg/dL 8 6   ALBUMIN g/dL 3 2*   TOTAL BILIRUBIN mg/dL 0 30   ALK PHOS U/L 64   ALT U/L 16   AST U/L 17   GLUCOSE RANDOM mg/dL 95       Results from last 7 days  Lab Units 11/29/18  2335   INR  1 05                   Imaging: I have personally reviewed pertinent reports  No orders to display       EKG, Pathology, and Other Studies Reviewed on Admission:   · EKG: NSR    Allscripts / Epic Records Reviewed: Yes     ** Please Note: This note has been constructed using a voice recognition system   **

## 2018-11-30 NOTE — SOCIAL WORK
Patient is medically cleared for [facility] to transport patient at 1500  Called and notified Jayant Galan in admissions at [facility] and patients sister Phani Salvador

## 2018-11-30 NOTE — CONSULTS
Consultation - [address] 80 y o  female MRN: [id number]  Unit/Bed#: -02 Encounter: [id number]    Assessment/Plan     Assessment:  Other hemorrhoids    Plan:  1  No open wounds present on sacrum and buttocks  Apply Hydoguard cream TID and PRN to sacrum, buttocks, and bilateral heels for prevention  2  Turn and reposition frequently  3  Wound care sign off    History of Present Illness   HPI:  Avtar Reich is a 80 y o  female who was seen for a possible wound on her sacrum  On assessment, no open wounds were present  Patient only had external hemorrhoids that were intact and not bleeding  Patient has dementia and was unable to provide any history  She denied any pain      Inpatient consult to Wound Care  Consult performed by: Ree Oscar ordered by: Linda Carrizales          Review of Systems   Unable to perform ROS: Dementia       Historical Information   Past Medical History:   Diagnosis Date    Abdominal aortic aneurysm without rupture ([address] )     Abnormal weight loss     Acute respiratory failure with hypoxia and hypercapnia ([address] )     Age related osteoporosis     Age-related nuclear cataract, bilateral     Anxiety     Atherosclerotic heart disease of native coronary artery without angina pectoris     Atrial fibrillation (HCC)     Benign neoplasm of sigmoid colon     Blindness of right eye     Calculus of gallbladder with chronic cholecystitis     Calculus of gallbladder without cholecystitis with obstruction     Chronic kidney disease, stage III (moderate) (HCC)     Chronic pain     Chronic pain due to trauma     Dementia     Difficulty walking     Disease of thyroid gland     Generalized atherosclerosis     GERD (gastroesophageal reflux disease)     Head injury     Heart failure ([address] )     Hemorrhoids     Hx of falling     Hypertension     Hypertensive retinopathy     IBS (irritable bowel syndrome)     Iron deficiency     Malignant neoplasm of [facility], face and neck ([facility] )     MI (myocardial infarction) ([facility] )     Mixed incontinence     Nonrheumatic mitral (valve) insufficiency     Occlusion and stenosis of left carotid artery     Polyosteoarthritis     Presence of intraocular lens     PVD (peripheral vascular disease) (HCC)     Rectal polyp     Recurrent pneumonia     Renal disorder     CKD stage 3    Thoracic aortic aneurysm without rupture ([facility] )     TIA (transient ischemic attack)     Tinea unguium     Unspecified abdominal pain     Unspecified fracture of unspecified thoracic vertebra, initial encounter for closed fracture ([facility] )      History reviewed  No pertinent surgical history  Social History   History   Alcohol Use No     History   Drug Use No     History   Smoking Status    Never Smoker   Smokeless Tobacco    Never Used     Family History: History reviewed  No pertinent family history  Meds/Allergies   all current active meds have been reviewed  Allergies   Allergen Reactions    Ethylenediamine Tetra-Acetate [Edetic Acid]     Fish-Derived Products     Hctz [Hydrochlorothiazide]     Iodides     Iodine     Lidocaine     Morphine     Neomycin     Paba Derivatives     Penicillins     Thiazide-Type Diuretics        Objective   Vitals: Blood pressure 163/77, pulse 95, temperature 98 6 °F (37 °C), resp  rate 16, weight 52 kg (114 lb 10 2 oz), SpO2 97 %  Physical Exam   Constitutional: She appears well-developed and well-nourished  HENT:   Head: Normocephalic and atraumatic  Eyes: Right eye exhibits no discharge  Left eye exhibits no discharge  Neck: Normal range of motion  No JVD present  Cardiovascular: Normal rate, regular rhythm and normal heart sounds  Pulmonary/Chest: Effort normal and breath sounds normal  No respiratory distress  Neurological: She is alert  Skin: Skin is warm and dry  No rash noted  No erythema  No pallor  Psychiatric: She has a normal mood and affect   Her behavior is normal        Lab Results: I have personally reviewed pertinent reports  Imaging: I have personally reviewed pertinent reports  EKG, Pathology, and Other Studies: I have personally reviewed pertinent reports  Code Status: Level 3 - DNAR and DNI  Advance Directive and Living Will:      Power of :    POLST:      Counseling / Coordination of Care  Total floor / unit time spent today 15 minutes  Greater than 50% of total time was spent with the patient and / or family counseling and / or coordination of care

## 2018-11-30 NOTE — SOCIAL WORK
Patient is a resident of [facility]  She is independent adl's, uses a RW to ambulate unassisted, feeds self  She will return to [facility] at discharge  Will follow and arrange transport

## 2018-11-30 NOTE — ASSESSMENT & PLAN NOTE
H&H Q 6H  GI consult  NPO  D5 with NSS 75 mL/hour  PPI gtt  Patient states she does not want blood transfusion  Cardiopulmonary monitoring

## 2018-11-30 NOTE — SOCIAL WORK
Spoke with patients sister Sana Vaca  She confirms that patient is a long term resident at [facility] - she's been there for 4 years  Discharge plan is for her to return to [facility] at discharge

## 2018-11-30 NOTE — ASSESSMENT & PLAN NOTE
-patient received a Protonix drip on the hospital   -her hemoglobin was trended and is stable at 9 4     -She presented with bright red blood per rectum but has had smears of melena   -patient did have acute blood loss anemia due to GI bleeding, POA  -I did discuss the case with GI  In 2015 the patient had a rectal polyp/mass that was unable to be excised on colonoscopy  Surgical consultation was recommended but the patient refused  I did have a lengthy discussion with the patient's niece and sister  They do not want any further workup, studies, or procedures done  They will likely make the patient do not hospitalize after discharge and may pursue hospice

## 2018-11-30 NOTE — ED NOTES
Lab called Unable to send type and screen results through system  Blood Type AB positive  Negative Antibodies       Katharine Schilling  11/30/18 0128

## 2018-11-30 NOTE — CONSULTS
Naveen Shallow  [id number]    54 y o   female      ASSESSMENT  1  Rectal bleeding  Apparently she was having bright red blood in the nursing home but had one smear of maroon/melena early this morning  Bleeding appears to have resolved  Suspect this is secondary to a rectal polyp mass that was identified a colonoscopy in 2015  Pathology of rectal mass polyp did show squamous mucosa with high-grade dysplasia  This was not able to be excised endoscopically  Surgical consultation was recommended at that time but she declined  She has refused any further endoscopic procedures since that time Sigmoid polyp also showed tubular adenoma with high-grade dysplasia  Sigmoid polyp was completely removed  PLAN  1  Ideally, a flexible sigmoidoscopy and surgical consult should be obtained  The patient and HANK Hansen  have refused any further workup    Chief Complaint   Patient presents with    Rectal Bleeding     pt presents to ER from [address] [place] [address] after noticing her having multiple episodes of rectal bleeding, patient had blood work done and has a known low H/H but patient was refusing blood        HPI   77-year-old female with a past medical history significant for CAD, atrial fibrillation (not on anticoagulation per medical reconciliation list), dementia, chronic kidney disease who resides at [address] [place] [address]  Unable unable to obtain any history from the patient due to dementia so history is obtained from the patient's chart  Presents with bright red blood according to patient's chart from the nursing home  No further bright red blood since admission  One small smear of melena/maroon stool early this a awais Flowers Unclear if she has lost weight or has had a change in bowel movements  She did undergo a colonoscopy in 2015 that did show a rectal polyp mass with pathology positive for high-grade dysplasia and a sigmoid polyp pathology positive for tubular adenoma    She refused surgery for the rectal polyp mass  Past Medical History:   Diagnosis Date    Abdominal aortic aneurysm without rupture (HCC)     Abnormal weight loss     Acute respiratory failure with hypoxia and hypercapnia (HCC)     Age related osteoporosis     Age-related nuclear cataract, bilateral     Anxiety     Atherosclerotic heart disease of native coronary artery without angina pectoris     Atrial fibrillation (HCC)     Benign neoplasm of sigmoid colon     Blindness of right eye     Calculus of gallbladder with chronic cholecystitis     Calculus of gallbladder without cholecystitis with obstruction     Chronic kidney disease, stage III (moderate) (HCC)     Chronic pain     Chronic pain due to trauma     Dementia     Difficulty walking     Disease of thyroid gland     Generalized atherosclerosis     GERD (gastroesophageal reflux disease)     Head injury     Heart failure (HCC)     Hemorrhoids     Hx of falling     Hypertension     Hypertensive retinopathy     IBS (irritable bowel syndrome)     Iron deficiency     Malignant neoplasm of head, face and neck (HCC)     [facility] (my[facility]) [facility]     Mixed incontinence     Nonrheumatic mitral (valve) insufficiency     Occlusion and stenosis of left carotid artery     Polyosteoarthritis     Presence of intraocular lens     P[facility]per[facility]) [facility]     Rectal polyp     Recurrent pneumonia     Renal disorder     CKD stage 3    Thoracic aortic aneurysm without rupture ([facility]     TIA (transient ischemic attack)     Tinea unguium     Unspecified abdominal pain     Unspecified fracture of unspecified thoracic vertebra, initial encounter for closed fracture [facility] )        History reviewed  No pertinent surgical history      Prescriptions Prior to Admission   Medication    acetaminophen (TYLENOL) 325 mg tablet    amLODIPine (NORVASC) 5 mg tablet    bisacodyl (DULCOLAX) 10 mg suppository    Cholecalciferol (VITAMIN D) 2000 units CAPS    digoxin (LANOXIN) 0 125 mg tablet    furosemide (LASIX) 40 mg tablet    isosorbide mononitrate (IMDUR) 30 mg 24 hr tablet    levothyroxine (SYNTHROID) 75 mcg tablet    magnesium hydroxide (MILK OF MAGNESIA) 400 mg/5 mL oral suspension    metoprolol tartrate (LOPRESSOR) 50 mg tablet    sodium phosphate-biphosphate (FLEET) 7-19 g 118 mL enema       Allergies   Allergen Reactions    Ethylenediamine Tetra-Acetate [Edetic Acid]     Fish-Derived Products     Hctz [Hydrochlorothiazide]     Iodides     Iodine     Lidocaine     Morphine     Neomycin     Paba Derivatives     Penicillins     Thiazide-Type Diuretics        Social History   Substance Use Topics    Smoking status: Never Smoker    Smokeless tobacco: Never Used    Alcohol use No       History reviewed  No pertinent family history      Review of Systems  General ROS: negative for weight loss, fever, night sweats  Psychological ROS: negative for depression or anxiety  Ophthalmic ROS: negative for any eye issues  ENT ROS: no scleral icterus  Hematological and Lymphatic: no issues with bleeding or adenopathy  Respiratory ROS: no chest pain, shortness of breath or cough  Cardiovascular ROS: no issues with chest pain, heart palpitations  Gastrointestinal ROS: no issues with nausea, vomiting, diarrhea or abdominal pain  Genito-Urinary ROS: no issues with urinary burning, urinary frequency or hematuria  Neurological ROS:  no asterixis  Dermatological ROS: no skin rashes or lesions  Rectal - prolapsed rectum, ? rectal mass        /77   Pulse 95   Temp 98 6 °F (37 °C)   Resp 16   Wt 52 kg (114 lb 10 2 oz)   SpO2 97%   BMI 21 66 kg/m²       Physical Exam     Constitutional:   no acute distress, non-toxic appearance   Eyes:  conjunctiva pale  HENT:  Atraumatic  Respiratory:  No respiratory distress   Cardiovascular:  Normal rate  GI:  Soft, nondistended, normal bowel sounds, nontender  Lymphatic:  No lymphadenopathy noted   Neurologic:  Alert & oriented x 2  Rectal - ulcerated rectal mass, questionable prolapsed rectum                Lab Results   Component Value Date    GLUCOSE 86 11/23/2015    INR 1 05 11/29/2018    CALCIUM 8 6 11/30/2018     11/23/2015    K 4 0 11/30/2018    CO2 29 11/30/2018     11/30/2018    BUN 58 (H) 11/30/2018    CREATININE 2 50 (H) 11/30/2018     Lab Results   Component Value Date    WBC 7 13 11/29/2018    HGB 9 4 (L) 11/30/2018    HCT 28 6 (L) 11/29/2018    MCV 99 (H) 11/29/2018     11/29/2018     Lab Results   Component Value Date    ALT 16 11/29/2018    AST 17 11/29/2018    ALKPHOS 64 11/29/2018    BILITOT 0 26 10/15/2015     No results found for: AMYLASE  Lab Results   Component Value Date    LIPASE 217 05/22/2015     Lab Results   Component Value Date    IRON 47 (L) 09/02/2015    TIBC 318 11/23/2015    FERRITIN 51 0 09/02/2015     Lab Results   Component Value Date    INR 1 05 11/29/2018           No results found  Counseling / Coordination of Care  Total floor / unit time spent today 25 minutes  Greater than 50% of total time was spent with the patient and / or family counseling and / or coordination of care  A description of the counseling / coordination of care: Rakel Zuñiga Ace

## 2018-11-30 NOTE — PLAN OF CARE
CARDIOVASCULAR - ADULT     Maintains optimal cardiac output and hemodynamic stability Progressing     Absence of cardiac dysrhythmias or at baseline rhythm Progressing        DISCHARGE PLANNING - CARE MANAGEMENT     Discharge to post-acute care or home with appropriate resources Progressing        GASTROINTESTINAL - ADULT     Maintains or returns to baseline bowel function Progressing     Maintains adequate nutritional intake Progressing        HEMATOLOGIC - ADULT     Maintains hematologic stability Progressing        INFECTION - ADULT     Absence or prevention of progression during hospitalization Progressing     Absence of fever/infection during neutropenic period Progressing        Knowledge Deficit     Patient/family/caregiver demonstrates understanding of disease process, treatment plan, medications, and discharge instructions Progressing        METABOLIC, FLUID AND ELECTROLYTES - ADULT     Electrolytes maintained within normal limits Progressing     Fluid balance maintained Progressing        MUSCULOSKELETAL - ADULT     Maintain or return mobility to safest level of function Progressing        NEUROSENSORY - ADULT     Achieves stable or improved neurological status Progressing     Achieves maximal functionality and self care Progressing        Nutrition/Hydration-ADULT     Nutrient/Hydration intake appropriate for improving, restoring or maintaining nutritional needs Progressing        PAIN - ADULT     Verbalizes/displays adequate comfort level or baseline comfort level Progressing        RESPIRATORY - ADULT     Achieves optimal ventilation and oxygenation Progressing        SAFETY ADULT     Patient will remain free of falls Progressing     Maintain or return to baseline ADL function Progressing     Maintain or return mobility status to optimal level Progressing        SKIN/TISSUE INTEGRITY - ADULT     Skin integrity remains intact Progressing     Incision(s), wounds(s) or drain site(s) healing without S/S of infection Progressing     Oral mucous membranes remain intact Progressing

## 2018-11-30 NOTE — UTILIZATION REVIEW
Initial Clinical Review    Admission: Date/Time/Statement: 11/30/18 @ 0136     Orders Placed This Encounter   Procedures    Inpatient Admission (expected length of stay for this patient is greater than two midnights)     Standing Status:   Standing     Number of Occurrences:   1     Order Specific Question:   Admitting Physician     Answer:   Dea Habermann [[id number]]     Order Specific Question:   Level of Care     Answer:   Level 1 Stepdown [13]     Order Specific Question:   Estimated length of stay     Answer:   More than 2 Midnights     Order Specific Question:   Certification     Answer:   I certify that inpatient services are medically necessary for this patient for a duration of greater than two midnights  See H&P and MD Progress Notes for additional information about the patient's course of treatment  ED: Date/Time/Mode of Arrival:   ED Arrival Information     Expected Arrival Acuity Means of Arrival Escorted By Service Admission Type    - 11/29/2018 23:20 Urgent Ambulance [facility] General Medicine Urgent    Arrival Complaint    rectal bleeding          Chief Complaint:   Chief Complaint   Patient presents with    Rectal Bleeding     pt presents to ER from [address] [place] [address] after noticing her having multiple episodes of rectal bleeding, patient had blood work done and has a known low H/H but patient was refusing blood        History of Illness: 80 y o  female with a PMH for CAD, AFib, CKD, CHF, dementia presents with rectal bleeding that began tonight  Patient denies pain and states that she has not noticed any dark or bloody bowel movements  Patient came via [facility] after an aide noticed the patient had multiple episodes of rectal bleeding  Patient has a known history of low hemoglobin and hematocrit has been worked up in the past   Patient states adamantly that she does not want a blood transfusion and may not wish to undergo EGD, colonoscopy  She is amicable to IVF       ED Vital Signs:   ED Triage Vitals [11/29/18 2323]   Temperature Pulse Respirations Blood Pressure SpO2   98 9 °F (37 2 °C) 78 18 (!) 189/81 97 %      Temp Source Heart Rate Source Patient Position - Orthostatic VS BP Location FiO2 (%)   Temporal Monitor Lying Left arm --      Pain Score       No Pain        Wt Readings from Last 1 Encounters:   11/30/18 52 kg (114 lb 10 2 oz)       Vital Signs (abnormal): //81    Abnormal Labs/Diagnostic Test Results: Hgb 9 0, Hct 28 6 --> 11/30 0600 Hgb 9 4  Na 135, CL 99, BUN 63, Cr 2 70, Albumin 3 2    EKG -- NSR    ED Treatment:   Medication Administration from 11/29/2018 2320 to 11/30/2018 0238     None          Past Medical/Surgical History:    Active Ambulatory Problems     Diagnosis Date Noted    Aneurysm of abdominal aorta ([facility] ) 06/06/2013    Arteriosclerosis of coronary artery 08/07/2012    Basal cell carcinoma of left side of nose 03/18/2015    Other persistent mental disorders due to conditions classified elsewhere 08/07/2012    Esophageal reflux 08/07/2012    Hypothyroidism 08/07/2012    Irritable bowel syndrome 06/06/2013    Peripheral vascular disease ([facility] ) 08/07/2012    Skin neoplasm 05/07/2014    TIA (transient ischemic attack)      Diagnosis Date    Abdominal aortic aneurysm without rupture (HCC)     Abnormal weight loss     Acute respiratory failure with hypoxia and hypercapnia (HCC)     Age related osteoporosis     Age-related nuclear cataract, bilateral     Anxiety     Atherosclerotic heart disease of native coronary artery without angina pectoris     Atrial fibrillation (HCC)     Benign neoplasm of sigmoid colon     Blindness of right eye     Calculus of gallbladder with chronic cholecystitis     Calculus of gallbladder without cholecystitis with obstruction     Chronic kidney disease, stage III (moderate) (HCC)     Chronic pain     Chronic pain due to trauma     Dementia     Difficulty walking     Disease of thyroid gland     Generalized atherosclerosis     GERD (gastroesophageal reflux disease)     Head injury     Heart failure (HCC)     Hemorrhoids     Hx of falling     Hypertension     Hypertensive retinopathy     IBS (irritable bowel syndrome)     Iron deficiency     Malignant neoplasm of head, face and neck (HCC)     MI (myocardial infarction) (HCC)     Mixed incontinence     Nonrheumatic mitral (valve) insufficiency     Occlusion and stenosis of left carotid artery     Polyosteoarthritis     Presence of intraocular lens     PVD (peripheral vascular disease) (HCC)     Rectal polyp     Recurrent pneumonia     Renal disorder     Thoracic aortic aneurysm without rupture (HCC)     TIA (transient ischemic attack)     Tinea unguium     Unspecified abdominal pain     Unspecified fracture of unspecified thoracic vertebra, initial encounter for closed fracture [facility])        Admitting Diagnosis: Rectal bleeding [K62 5]  Acute upper GI bleed [K92 2]  Sacral wound [S31 000A]    Age/Sex: 80 y o  female    Assessment/Plan:       * GI bleed   Assessment & Plan     H&H Q 6H  GI consult  NPO  D5 with NSS 75 mL/hour  PPI gtt  Patient states she does not want blood transfusion  Type and screen  Cardiopulmonary monitoring      Chronic kidney disease, stage III (moderate) (HCC)   Assessment & Plan     Cr 2 7, likely patients new baseline, continue to monitor            Anticipated Length of Stay:  Patient will be admitted on an Inpatient basis with an anticipated length of stay of  Greater than 2 midnights     Justification for Hospital Stay: Rectal bleed       Admission Orders:  Scheduled Meds:   Current Facility-Administered Medications:  acetaminophen 650 mg Oral Q6H PRN   amLODIPine 5 mg Oral Daily   digoxin 125 mcg Oral Daily   furosemide 40 mg Oral Daily   isosorbide mononitrate 30 mg Oral Daily   levothyroxine 37 5 mcg Oral Early Morning   metoprolol tartrate 50 mg Oral BID   ondansetron 4 mg Intravenous Q6H PRN   pantoprozole (PROTONIX) infusion (Continuous) 8 mg/hr Intravenous Continuous   sodium chloride 75 mL/hr Intravenous Continuous     Consult GI, wound care  Npo  Hgb q8h  Up with assist  SCD's  PT/OT evals

## 2018-12-01 LAB
ABO GROUP BLD: NORMAL
BLD GP AB SCN SERPL QL: NEGATIVE
RH BLD: POSITIVE
SPECIMEN EXPIRATION DATE: NORMAL

## 2018-12-01 NOTE — PHYSICIAN ADVISOR
Current patient class: Inpatient  The patient is currently on Hospital Day: 2 at [place] [address]      The patient was admitted to the hospital at [address] [place] [address] on 11/30/18 for the following diagnosis:  Rectal bleeding [K62 5]  Acute upper GI bleed [K92 2]  Sacral wound [S31 000A]       There is documentation in the medical record of an expected length of stay of at least 2 midnights  The patient is therefore expected to satisfy the 2 midnight benchmark and given the 2 midnight presumption is appropriate for INPATIENT ADMISSION  Given this expectation of a satisfying stay, CMS instructs us that the patient is most often appropriate for inpatient admission under part A provided medical necessity is documented in the chart  After review of the relevant documentation, labs, vital signs and test results, the patient is appropriate for INPATIENT ADMISSION  Admission to the hospital as an inpatient is a complex decision making process which requires the practitioner to consider the patients presenting complaint, history and physical examination and all relevant testing  With this in mind, in this case, the patient was deemed appropriate for INPATIENT ADMISSION  After review of the documentation and testing available at the time of the admission I concur with this clinical determination of medical necessity  Rationale is as follows:     The patient is a 80 yrs old Female who presented to the ED at 11/29/2018 11:21 PM with a chief complaint of Rectal Bleeding (pt presents to ER from [address] [place] [address] after noticing her having multiple episodes of rectal bleeding, patient had blood work done and has a known low H/H but patient was refusing blood )    The patients vitals on arrival were ED Triage Vitals [11/29/18 2323]   Temperature Pulse Respirations Blood Pressure SpO2   98 9 °F (37 2 °C) 78 18 (!) 189/81 97 %      Temp Source Heart Rate Source Patient Position - Orthostatic VS BP Location FiO2 (%) Temporal Monitor Lying Left arm --      Pain Score       No Pain           Past Medical History:   Diagnosis Date    Abdominal aortic aneurysm without rupture (HCC)     Abnormal weight loss     Acute respiratory failure with hypoxia and hypercapnia (HCC)     Age related osteoporosis     Age-related nuclear cataract, bilateral     Anxiety     Atherosclerotic heart disease of native coronary artery without angina pectoris     Atrial fibrillation (HCC)     Benign neoplasm of sigmoid colon     Blindness of right eye     Calculus of gallbladder with chronic cholecystitis     Calculus of gallbladder without cholecystitis with obstruction     Chronic kidney disease, stage III (moderate) (HCC)     Chronic pain     Chronic pain due to trauma     Dementia     Difficulty walking     Disease of thyroid gland     Generalized atherosclerosis     GERD (gastroesophageal reflux disease)     Head injury     Heart failure (HCC)     Hemorrhoids     Hx of falling     Hypertension     Hypertensive retinopathy     IBS (irritable bowel syndrome)     Iron deficiency     Malignant neoplasm of head, face and neck (HCC)     MI (myocardial infarction) ([address] )     Mixed incontinence     Nonrheumatic mitral (valve) insufficiency     Occlusion and stenosis of left carotid artery     Polyosteoarthritis     Presence of intraocular lens     [facility]per[facility]) [facility]     Rectal polyp     Recurrent pneumonia     Renal disorder     CKD stage 3    [facility]acic [facility] aneurysm without rupture [facility]     TIA (transient ischemic attack)     Tinea unguium     Unspecified abdominal pain     Unspecified fracture of unspecified thoracic vertebra, initial encounter for closed fracture ([facility] )      History reviewed  No pertinent surgical history          Consults have been placed to:   IP CONSULT TO GASTROENTEROLOGY  IP CONSULT TO WOUND CARE    Vitals:    11/30/18 0400 11/30/18 0721 11/30/18 0800 11/30/18 1200 BP: 163/73 163/77 (!) 171/75 158/70   BP Location:       Pulse: 80 95 92 77   Resp: 16 16 22 22   Temp:  98 6 °F (37 °C)     TempSrc:       SpO2: 97% 97%     Weight:  52 kg (114 lb 10 2 oz)         Most recent labs:    Recent Labs      11/29/18   2332  11/29/18   2335  11/30/18   0448   WBC  7 13   --    --    HGB  9 0*   --   9 4*   HCT  28 6*   --    --    PLT  279   --    --    K  4 4   --   4 0   CALCIUM  8 6   --   8 6   BUN  63*   --   58*   CREATININE  2 70*   --   2 50*   INR   --   1 05   --    TROPONINI  0 02   --    --    AST  17   --    --    ALT  16   --    --    ALKPHOS  64   --    --        Scheduled Meds:  Continuous Infusions:  No current facility-administered medications for this encounter  PRN Meds:      Surgical procedures (if appropriate):

## 2018-12-07 ENCOUNTER — PATIENT OUTREACH (OUTPATIENT)
Dept: OTHER | Facility: HOSPITAL | Age: 83
End: 2018-12-07

## 2019-02-28 ENCOUNTER — PATIENT OUTREACH (OUTPATIENT)
Dept: CASE MANAGEMENT | Facility: OTHER | Age: 84
End: 2019-02-28

## 2019-06-09 ENCOUNTER — TELEPHONE (OUTPATIENT)
Dept: OTHER | Facility: OTHER | Age: 84
End: 2019-06-09

## 2019-06-10 NOTE — TELEPHONE ENCOUNTER
I spoke with staff at [facility] [place] - we had ordered labs and had placed her on cephalexin for warm red foot  Labs have now returned with  Her uric acid level being  elevated and will start her on prednisone as well as allopurinol from prevention standpoint

## 2019-07-10 ENCOUNTER — HOSPITAL ENCOUNTER (EMERGENCY)
Facility: HOSPITAL | Age: 84
Discharge: HOME | End: 2019-07-10
Attending: EMERGENCY MEDICINE | Admitting: EMERGENCY MEDICINE
Payer: MEDICARE

## 2019-07-10 ENCOUNTER — APPOINTMENT (EMERGENCY)
Dept: RADIOLOGY | Facility: HOSPITAL | Age: 84
End: 2019-07-10
Payer: MEDICARE

## 2019-07-10 ENCOUNTER — APPOINTMENT (EMERGENCY)
Dept: CT IMAGING | Facility: HOSPITAL | Age: 84
End: 2019-07-10
Payer: MEDICARE

## 2019-07-10 VITALS
OXYGEN SATURATION: 98 % | HEART RATE: 98 BPM | TEMPERATURE: 98.2 F | BODY MASS INDEX: 21.64 KG/M2 | WEIGHT: 114.64 LBS | HEIGHT: 61 IN | RESPIRATION RATE: 20 BRPM | SYSTOLIC BLOOD PRESSURE: 145 MMHG | DIASTOLIC BLOOD PRESSURE: 66 MMHG

## 2019-07-10 DIAGNOSIS — S42.292A HUMERAL HEAD FRACTURE, LEFT, CLOSED, INITIAL ENCOUNTER: Primary | ICD-10-CM

## 2019-07-10 LAB
ANION GAP BLD CALC-SCNC: 13 MMOL/L (ref 4–13)
BUN BLD-MCNC: 53 MG/DL (ref 5–25)
CA-I BLD-SCNC: 0.99 MMOL/L (ref 1.12–1.32)
CHLORIDE BLD-SCNC: 107 MMOL/L (ref 100–108)
CREAT BLD-MCNC: 2.5 MG/DL (ref 0.6–1.3)
GFR SERPL CREATININE-BSD FRML MDRD: 16 ML/MIN/1.73SQ M
GLUCOSE SERPL-MCNC: 102 MG/DL (ref 65–140)
HCT VFR BLD CALC: 31 % (ref 34.8–46.1)
HGB BLDA-MCNC: 10.5 G/DL (ref 11.5–15.4)
PCO2 BLD: 21 MMOL/L (ref 21–32)
POTASSIUM BLD-SCNC: 4.5 MMOL/L (ref 3.5–5.3)
SODIUM BLD-SCNC: 135 MMOL/L (ref 136–145)
SPECIMEN SOURCE: ABNORMAL

## 2019-07-10 PROCEDURE — 85014 HEMATOCRIT: CPT

## 2019-07-10 PROCEDURE — 71045 X-RAY EXAM CHEST 1 VIEW: CPT

## 2019-07-10 PROCEDURE — 72170 X-RAY EXAM OF PELVIS: CPT

## 2019-07-10 PROCEDURE — 93005 ELECTROCARDIOGRAM TRACING: CPT

## 2019-07-10 PROCEDURE — 99285 EMERGENCY DEPT VISIT HI MDM: CPT

## 2019-07-10 PROCEDURE — 80047 BASIC METABLC PNL IONIZED CA: CPT

## 2019-07-10 PROCEDURE — 72125 CT NECK SPINE W/O DYE: CPT

## 2019-07-10 PROCEDURE — 99284 EMERGENCY DEPT VISIT MOD MDM: CPT | Performed by: EMERGENCY MEDICINE

## 2019-07-10 PROCEDURE — 73030 X-RAY EXAM OF SHOULDER: CPT

## 2019-07-10 PROCEDURE — 70450 CT HEAD/BRAIN W/O DYE: CPT

## 2019-07-10 NOTE — ED PROVIDER NOTES
H&P Exam - Trauma   Earnest Ladd 80 y o  female MRN: [id number]  Unit/Bed#: ED 08/ED 08 Encounter: [id number]    Assessment/Plan   Trauma Alert: Trauma Acuity: Trauma Evaluation  Model of Arrival: Trauma Mode of Arrival: ALS via Trauma Squad Name and Number: [id number]  Trauma Team: Current Providers  Attending Provider: Melissa Manley DO  Registered Nurse: Karmen Bender RN  Registered Nurse: Blanca Kumar RN  Consultants: None    Trauma Active Problems:  Unwitnessed fall out of bed from nursing home    Trauma Plan:  Labs and x-rays ordered    Chief Complaint:   Chief Complaint   Patient presents with    Multiple Falls     Patient fell out of bed a [facility]  Patient c/o left shoulder pain  Unknown loss of consciousness   No blood thinners  History of Present Illness   HPI:  Earnest Ladd is a 80 y o  female who presents with dementia presents for evaluation after a fall from bed she was complaining of left shoulder pain  She is not on blood thinners  Mechanism:Details of Incident: Pt fell out of bed at the nursing home and is c/o generalized pain  Injury Date: 07/10/19        80year-old female unwitnessed fall from bed at a nursing home complaining of left shoulder pain  Patient with dementia unable to provide more details she does have what appears to be a chronic necrotic left toes      History provided by:  Nursing home and EMS personnel  History limited by:  Dementia and age  Fall   Mechanism of injury: fall    Injury location:  Shoulder/arm  Shoulder/arm injury location:  L shoulder  Incident location:  halfway  Time since incident: Unclear  Fall:     Fall occurred:  From a bed    Impact surface:  Hard floor    Point of impact:  Unable to specify  Tetanus status:  Unknown    Review of Systems   Unable to perform ROS: Dementia       Historical Information     Immunizations: There is no immunization history on file for this patient      Past Medical History: Diagnosis Date    Abdominal aortic aneurysm without rupture (HCC)     Abnormal weight loss     Acute respiratory failure with hypoxia and hypercapnia (HCC)     Age related osteoporosis     Age-related nuclear cataract, bilateral     Anxiety     Atherosclerotic heart disease of native coronary artery without angina pectoris     Atrial fibrillation (HCC)     Benign neoplasm of sigmoid colon     Blindness of right eye     Calculus of gallbladder with chronic cholecystitis     Calculus of gallbladder without cholecystitis with obstruction     Chronic kidney disease, stage III (moderate) (HCC)     Chronic pain     Chronic pain due to trauma     Dementia     Difficulty walking     Disease of thyroid gland     Generalized atherosclerosis     GERD (gastroesophageal reflux disease)     Head injury     Heart failure (HCC)     Hemorrhoids     Hx of falling     Hypertension     Hypertensive retinopathy     IBS (irritable bowel syndrome)     Iron deficiency     Malignant neoplasm of head, face and neck (HCC)     [facility] infarction) [facility]     Mixed incontinence     Nonrheumatic mitral (valve) insufficiency     Occlusion and stenosis of left carotid artery     Polyosteoarthritis     Presence of intraocular lens     [facility]VD [facility]peripheral vascular disease) ([facility]     Rectal polyp     Recurrent pneumonia     Renal disorder     CKD stage 3    Thoracic aortic aneurysm without rupture ([facility] )     TIA (transient ischemic attack)     Tinea unguium     Unspecified abdominal pain     Unspecified fracture of unspecified thoracic vertebra, [facility] encounter [facility] fracture [facility]      History reviewed  No pertinent family history  History reviewed  No pertinent surgical history      Social History     Socioeconomic History    Marital status: Single     Spouse name: None    Number of children: None    Years of education: None    Highest education level: None   Occupational History    None Social Needs    Financial resource strain: None    Food insecurity:     Worry: None     Inability: None    Transportation needs:     Medical: None     Non-medical: None   Tobacco Use    Smoking status: Never Smoker    Smokeless tobacco: Never Used   Substance and Sexual Activity    Alcohol use: No    Drug use: No    Sexual activity: Not Currently   Lifestyle    Physical activity:     Days per week: None     Minutes per session: None    Stress: None   Relationships    Social connections:     Talks on phone: None     Gets together: None     Attends Restorationist service: None     Active member of club or organization: None     Attends meetings of clubs or organizations: None     Relationship status: None    Intimate partner violence:     Fear of current or ex partner: None     Emotionally abused: None     Physically abused: None     Forced sexual activity: None   Other Topics Concern    None   Social History Narrative    None       Family History: non-contributory    Meds/Allergies   Prior to Admission Medications   Prescriptions Last Dose Informant Patient Reported? Taking?    Cholecalciferol (VITAMIN D) 2000 units CAPS   Yes No   Sig: Take by mouth   acetaminophen (TYLENOL) 325 mg tablet   Yes No   Sig: Take 1-2 tablets by mouth every 4 (four) hours as needed   amLODIPine (NORVASC) 5 mg tablet   Yes No   Sig: Take 1 tablet by mouth daily   bisacodyl (DULCOLAX) 10 mg suppository   Yes No   Sig: Insert 1 suppository into the rectum daily   digoxin (LANOXIN) 0 125 mg tablet   Yes No   Sig: Take by mouth   furosemide (LASIX) 40 mg tablet   Yes No   Sig: Take 1 tablet by mouth daily   isosorbide mononitrate (IMDUR) 30 mg 24 hr tablet   Yes No   Sig: Take 1 tablet by mouth daily   levothyroxine (SYNTHROID) 75 mcg tablet   Yes No   Sig: Take 0 5 tablets by mouth daily   magnesium hydroxide (MILK OF MAGNESIA) 400 mg/5 mL oral suspension   Yes No   Sig: Take by mouth   metoprolol tartrate (LOPRESSOR) 50 mg tablet Yes No   Sig: Take 1 tablet by mouth 2 (two) times a day   pantoprazole (PROTONIX) 40 mg tablet   No No   Sig: Take 1 tablet (40 mg total) by mouth daily in the early morning   sodium phosphate-biphosphate (FLEET) 7-19 g 118 mL enema   Yes No   Sig: Insert into the rectum      Facility-Administered Medications: None       Allergies   Allergen Reactions    Ethylenediamine Tetra-Acetate [Edetic Acid]     Fish-Derived Products     Hctz [Hydrochlorothiazide]     Iodides     Iodine     Lidocaine     Morphine     Neomycin     Paba Derivatives     Penicillins     Thiazide-Type Diuretics        PHYSICAL EXAM    PE limited by:  Dementia    Objective   Vitals:   First set: Temperature: 98 2 °F (36 8 °C) (07/10/19 0953)  Pulse: 99 (07/10/19 0953)  Respirations: 20 (07/10/19 0953)  Blood Pressure: 135/58 (07/10/19 0953)  SpO2: 96 % (07/10/19 0953)    Primary Survey:   (A) Airway:  Patent  (B) Breathing:  Clear bilateral  (C) Circulation: Pulses:   carotid  4/4  (D) Disabliity:  Eye Opening: To voice = 3  (E) Expose:  Completed    Secondary Survey: (Click on Physical Exam tab above)  Physical Exam   Constitutional: She appears distressed  Frail cachectic confused   HENT:   Head: Normocephalic and atraumatic  Eyes: EOM are normal  Right eye exhibits no discharge  Left eye exhibits no discharge  No scleral icterus  Pupils 3 mm bilateral sluggish   Neck: No JVD present  No tracheal deviation present  No obvious cervical thoracic or lumbar acute trauma  She does have some mild erythema to the mid thoracic area appears more related to pressure   Cardiovascular: Normal rate and regular rhythm  Exam reveals no gallop and no friction rub  Pulmonary/Chest: Effort normal and breath sounds normal  No stridor  No respiratory distress  She has no wheezes  Abdominal: Soft  Bowel sounds are normal  She exhibits no distension  There is no tenderness  Musculoskeletal: She exhibits tenderness (Diffuse achiness)   She exhibits no edema  Pain with movement of all extremities without of deformity or obvious trauma seems to be more related in the left shoulder area   Neurological:   Opens eyes to voice no obvious focal motor deficit patient has significant dementia   Skin: No rash noted  She is not diaphoretic  Chronic necrotic foul-smelling toes to the left foot   Nursing note and vitals reviewed        Invasive Devices     Peripheral Intravenous Line            Peripheral IV 11/29/18 Right Wrist 222 days    Peripheral IV 07/10/19 Left Antecubital less than 1 day                Lab Results:   Results Reviewed     Procedure Component Value Units Date/Time    POCT Chem 8+ [[id number]]  (Abnormal) Collected:  07/10/19 1122    Lab Status:  Final result Specimen:  Venous Updated:  07/10/19 1127     SODIUM, I-STAT 135 mmol/l      Potassium, i-STAT 4 5 mmol/L      Chloride, istat 107 mmol/L      CO2, i-STAT 21 mmol/L      Anion Gap, i-STAT 13 mmol/L      Calcium, Ionized i-STAT 0 99 mmol/L      BUN, I-STAT 53 mg/dl      Creatinine, i-STAT 2 5 mg/dl      eGFR 16 ml/min/1 73sq m      Glucose, i-STAT 102 mg/dl      Hct, i-STAT 31 %      Hgb, i-STAT 10 5 g/dl      Specimen Type VENOUS    Narrative:       National Kidney Disease Foundation guidelines for Chronic Kidney Disease (CKD):     Stage 1 with normal or high GFR (GFR > 90 mL/min/1 73 square meters)    Stage 2 Mild CKD (GFR = 60-89 mL/min/1 73 square meters)    Stage 3A Moderate CKD (GFR = 45-59 mL/min/1 73 square meters)    Stage 3B Moderate CKD (GFR = 30-44 mL/min/1 73 square meters)    Stage 4 Severe CKD (GFR = 15-29 mL/min/1 73 square meters)    Stage 5 End Stage CKD (GFR <15 mL/min/1 73 square meters)  Note: GFR calculation is accurate only with a steady state creatinine                 Imaging Studies:   Direct to CT: No  XR shoulder 2+ views LEFT   Final Result by Uche Herrera MD (07/10 1111)      Age-indeterminate impaction fracture at the medial aspect of the humeral head             Workstation performed: [id number]         XR pelvis ap only 1 or 2 vw   Final Result by Marcio Patel MD (07/10 1112)      No acute osseous abnormality  Workstation performed: [id number]         XR chest 1 view portable   Final Result by Marcio Patel MD (07/10 1123)      Limited study  No acute cardiopulmonary disease  Workstation performed: [id number]         CT head without contrast   Final Result by Marcio Patel MD (07/10 1047)      No acute intracranial abnormality  Workstation performed: [id number]         CT cervical spine without contrast   Final Result by Marcio Patel MD (07/10 1057)      No cervical spine fracture or traumatic malalignment  Workstation performed: [id number]             Other Studies:  Basic labs and EKG    Code Status: Prior  Advance Directive and Living Will:      Power of :    POLST:      Procedures  ECG 12 Lead Documentation Only  Date/Time: 7/10/2019 10:16 AM  Performed by: Sampson Savage DO  Authorized by: Sampson Savage DO     ECG reviewed by me, the ED Provider: yes    Patient location:  ED  Quality:     Tracing quality:  Limited by artifact  Rate:     ECG rate:  100  Rhythm:     Rhythm: sinus rhythm    T waves:     T waves: non-specific               ED Course         MDM    Disposition  [facility] Transfer: No  Final diagnoses:   Humeral head fracture, left, closed, initial encounter     Time reflects when diagnosis was documented in both MDM as applicable and the Disposition within this note     Time User Action Codes Description Comment    7/10/2019 11:41 AM Dena Speaker Add [[id number]] Humeral head fracture, left, closed, initial encounter       ED Disposition     ED Disposition Condition Date/Time Comment    Discharge Stable Wed Jul 10, 2019 11:41 AM Thomas Slipper discharge to home/self care              Follow-up Information     Follow up With Specialties Details Why Contact Info Additional [address] [facility] Orthopedic Surgery In 1 week Left shoulder fracture [address] [place] [address]  [phone] [address] [facility], [address], [place], [place], [address]        Patient's Medications   Discharge Prescriptions    No medications on file     No discharge procedures on file        ED Provider  Electronically Signed by         Papito Johnson DO  07/10/19 1257

## 2019-07-11 LAB
ATRIAL RATE: 100 BPM
P AXIS: 84 DEGREES
PR INTERVAL: 202 MS
QRS AXIS: -64 DEGREES
QRSD INTERVAL: 82 MS
QT INTERVAL: 336 MS
QTC INTERVAL: 433 MS
T WAVE AXIS: 92 DEGREES
VENTRICULAR RATE: 100 BPM

## 2019-07-11 PROCEDURE — 93010 ELECTROCARDIOGRAM REPORT: CPT | Performed by: INTERNAL MEDICINE
